# Patient Record
Sex: FEMALE | Race: WHITE | HISPANIC OR LATINO | Employment: FULL TIME | URBAN - NONMETROPOLITAN AREA
[De-identification: names, ages, dates, MRNs, and addresses within clinical notes are randomized per-mention and may not be internally consistent; named-entity substitution may affect disease eponyms.]

---

## 2023-12-07 ENCOUNTER — OFFICE VISIT (OUTPATIENT)
Dept: URGENT CARE | Facility: CLINIC | Age: 18
End: 2023-12-07
Payer: COMMERCIAL

## 2023-12-07 VITALS
BODY MASS INDEX: 34.71 KG/M2 | SYSTOLIC BLOOD PRESSURE: 127 MMHG | HEIGHT: 62 IN | TEMPERATURE: 98.4 F | OXYGEN SATURATION: 98 % | DIASTOLIC BLOOD PRESSURE: 68 MMHG | HEART RATE: 97 BPM | WEIGHT: 188.6 LBS | RESPIRATION RATE: 20 BRPM

## 2023-12-07 DIAGNOSIS — Z02.4 DRIVER'S PERMIT PE (PHYSICAL EXAMINATION): Primary | ICD-10-CM

## 2023-12-07 NOTE — LETTER
December 7, 2023     Patient: Chetna Lopes   YOB: 2005   Date of Visit: 12/7/2023       To Whom it May Concern:    Chetna Lopes was seen in my clinic on 12/7/2023. If you have any questions or concerns, please don't hesitate to call.          Sincerely,          Junior Winchester PA-C        CC: No Recipients

## 2023-12-07 NOTE — PATIENT INSTRUCTIONS
Teen  Safety   WHAT YOU NEED TO KNOW:   Teen injuries and deaths caused by car crashes can be prevented. Be aware of the leading causes of teen car crashes. Stay safe by using a parent-teen driving agreement. DISCHARGE INSTRUCTIONS:   What to know about teen  safety:  Teen injuries and deaths caused by car crashes can be prevented. Be aware of the leading causes of teen car crashes. Use a parent-teen driving agreement. The agreement goes through safety actions promised by the teen. It also tells what the consequences are if the actions are not followed. Ask your healthcare provider where to get the agreement. Teen  safety guidelines:   Always wear your seatbelt. The easiest way to prevent deaths in crashes is to buckle up. Be aware of possible problems. Problems may include other vehicles, weather, pedestrians, and bicyclists. Make sure to practice on different roads, at different times of day. Also practice in all types of weather. Give driving your full attention. Distractions can increase your risk of a crash. Do not  talk on a cellphone or text while driving. Food and radios can also be a distraction while you are driving. Do not eat or try to adjust the radio while driving. Limit the number of teen passengers. Your risk for a crash goes up if you allow other teens in your car. Follow your state's restrictions for the number of teens in your car. Your state may say 0 to 1 teen. Nighttime driving increases your risk for crashes. Drive during daytime hours or be off the road fairly early in the evening. Be well rested when you drive. Do not  drive while you are drowsy or tired. Do not drive while impaired. One alcoholic drink can cause impairment. Drugs can also cause impairment. Do not  drive if you are using drugs. Obey the speed limits. Make sure your speed matches the road conditions.  Leave enough space between you and the car in front of you in case of sudden stops. © Copyright [employer] 2023 Information is for End User's use only and may not be sold, redistributed or otherwise used for commercial purposes. The above information is an  only. It is not intended as medical advice for individual conditions or treatments. Talk to your doctor, nurse or pharmacist before following any medical regimen to see if it is safe and effective for you.

## 2023-12-07 NOTE — PROGRESS NOTES
[facility]        NAME: Lorenzo Bowen is a 19 y.o. female  : 2005    MRN: [id number]  DATE: 2023  TIME: 3:12 PM    Assessment and Plan   's permit PE (physical examination) [Z02.4]  1. 's permit PE (physical examination)              Patient Instructions     Patient Instructions   Teen  Safety   WHAT YOU NEED TO KNOW:   Teen injuries and deaths caused by car crashes can be prevented. Be aware of the leading causes of teen car crashes. Stay safe by using a parent-teen driving agreement. DISCHARGE INSTRUCTIONS:   What to know about teen  safety:  Teen injuries and deaths caused by car crashes can be prevented. Be aware of the leading causes of teen car crashes. Use a parent-teen driving agreement. The agreement goes through safety actions promised by the teen. It also tells what the consequences are if the actions are not followed. Ask your healthcare provider where to get the agreement. Teen  safety guidelines:   Always wear your seatbelt. The easiest way to prevent deaths in crashes is to buckle up. Be aware of possible problems. Problems may include other vehicles, weather, pedestrians, and bicyclists. Make sure to practice on different roads, at different times of day. Also practice in all types of weather. Give driving your full attention. Distractions can increase your risk of a crash. Do not  talk on a cellphone or text while driving. Food and radios can also be a distraction while you are driving. Do not eat or try to adjust the radio while driving. Limit the number of teen passengers. Your risk for a crash goes up if you allow other teens in your car. Follow your state's restrictions for the number of teens in your car. Your state may say 0 to 1 teen. Nighttime driving increases your risk for crashes. Drive during daytime hours or be off the road fairly early in the evening. Be well rested when you drive.   Do not drive while you are drowsy or tired. Do not drive while impaired. One alcoholic drink can cause impairment. Drugs can also cause impairment. Do not  drive if you are using drugs. Obey the speed limits. Make sure your speed matches the road conditions. Leave enough space between you and the car in front of you in case of sudden stops. © Copyright [employer] 2023 Information is for End User's use only and may not be sold, redistributed or otherwise used for commercial purposes. The above information is an  only. It is not intended as medical advice for individual conditions or treatments. Talk to your doctor, nurse or pharmacist before following any medical regimen to see if it is safe and effective for you. Follow up with PCP in 3-5 days. Proceed to  ER if symptoms worsen. Chief Complaint     Chief Complaint   Patient presents with    drivers license physical         History of Present Illness       Patient presents to the clinic for a 's physical.  I did review her history. There is no history of seizure disorder, syncope, hypertension, diabetes, neurological disorder, psychiatric disorder, or other medical problems that would interfere with driving. She denies drug or alcohol abuse. Review of Systems   Review of Systems   Constitutional:  Negative for chills and fever. HENT:  Negative for ear pain and sore throat. Eyes:  Negative for pain and visual disturbance. Respiratory:  Negative for cough and shortness of breath. Cardiovascular:  Negative for chest pain and palpitations. Gastrointestinal:  Negative for abdominal pain and vomiting. Genitourinary:  Negative for dysuria and hematuria. Musculoskeletal:  Negative for arthralgias and back pain. Skin:  Negative for color change and rash. Neurological:  Negative for seizures and syncope. All other systems reviewed and are negative.         Current Medications     No current outpatient medications on file. Current Allergies     Allergies as of 12/07/2023    (No Known Allergies)            The following portions of the patient's history were reviewed and updated as appropriate: allergies, current medications, past family history, past medical history, past social history, past surgical history and problem list.     History reviewed. No pertinent past medical history. History reviewed. No pertinent surgical history. History reviewed. No pertinent family history. Medications have been verified. Objective   /68   Pulse 97   Temp 98.4 °F (36.9 °C)   Resp 20   Ht 5' 2" (1.575 m)   Wt 85.5 kg (188 lb 9.6 oz)   SpO2 98%   BMI 34.50 kg/m²        Physical Exam     Physical Exam  Constitutional:       Appearance: She is well-developed. She is not diaphoretic. HENT:      Head: Normocephalic. Eyes:      General:         Right eye: No discharge. Left eye: No discharge. Pupils: Pupils are equal, round, and reactive to light. Neck:      Thyroid: No thyromegaly. Cardiovascular:      Rate and Rhythm: Normal rate. Heart sounds: No murmur heard. Pulmonary:      Effort: Pulmonary effort is normal. No respiratory distress. Breath sounds: No wheezing or rales. Chest:      Chest wall: No tenderness. Abdominal:      General: There is no distension. Palpations: Abdomen is soft. Tenderness: There is no abdominal tenderness. There is no guarding or rebound. Musculoskeletal:         General: Normal range of motion. Cervical back: Normal range of motion. Lymphadenopathy:      Cervical: No cervical adenopathy. Skin:     General: Skin is warm. Neurological:      Mental Status: She is alert and oriented to person, place, and time.                -Are no restrictions to operating a motor vehicle

## 2024-06-01 ENCOUNTER — OFFICE VISIT (OUTPATIENT)
Dept: URGENT CARE | Facility: MEDICAL CENTER | Age: 19
End: 2024-06-01
Payer: COMMERCIAL

## 2024-06-01 VITALS
HEART RATE: 78 BPM | HEIGHT: 61 IN | RESPIRATION RATE: 18 BRPM | TEMPERATURE: 98.9 F | WEIGHT: 170 LBS | BODY MASS INDEX: 32.1 KG/M2 | OXYGEN SATURATION: 98 % | DIASTOLIC BLOOD PRESSURE: 70 MMHG | SYSTOLIC BLOOD PRESSURE: 106 MMHG

## 2024-06-01 DIAGNOSIS — K52.9 GASTROENTERITIS: Primary | ICD-10-CM

## 2024-06-01 PROCEDURE — 99212 OFFICE O/P EST SF 10 MIN: CPT | Performed by: PHYSICIAN ASSISTANT

## 2024-06-01 PROCEDURE — S9088 SERVICES PROVIDED IN URGENT: HCPCS | Performed by: PHYSICIAN ASSISTANT

## 2024-06-01 RX ORDER — ONDANSETRON 4 MG/1
4 TABLET, ORALLY DISINTEGRATING ORAL EVERY 6 HOURS PRN
Qty: 15 TABLET | Refills: 0 | Status: SHIPPED | OUTPATIENT
Start: 2024-06-01

## 2024-06-01 NOTE — LETTER
June 1, 2024     Patient: Krystal Meyer   YOB: 2005   Date of Visit: 6/1/2024       To Whom It May Concern:    It is my medical opinion that Krystal Meyer may return to work on 06/02/24 .    If you have any questions or concerns, please don't hesitate to call.         Sincerely,        Annetta Urbano PA-C    CC: No Recipients

## 2024-06-01 NOTE — PROGRESS NOTES
[facility]        NAME: Krystal Meyer is a 18 y.o. female  : 2005    MRN: [id number]  DATE: 2024  TIME: 6:22 PM    Assessment and Plan   Gastroenteritis [K52.9]  1. Gastroenteritis  ondansetron (ZOFRAN-ODT) 4 mg disintegrating tablet            Patient Instructions     Take Zofran as needed for nausea or vomiting  [place] diet and gradually increase diet as tolerated  No fried food until symptoms resolve  Refrain from dairy products until feeling better.  Fluids  Tylenol as needed for fever or pain.  If symptoms worsen have your self rechecked.  If symptoms fail to improve follow-up with PCP.    Follow up with PCP in 3-5 days.  Proceed to  ER if symptoms worsen.    If tests have been performed at [facility], our office will contact you with results if changes need to be made to the care plan discussed with you at the visit.  You can review your full results on [facility].    Chief Complaint     Chief Complaint   Patient presents with    Diarrhea    Vomiting    Nausea     All symptoms started on Thursday          History of Present Illness       Patient started with nausea vomiting and diarrhea this past Thursday.  There is associated crampy abdominal pain after eating and prior to use of the bathroom.  Patient denies fever or difficulty urinating.  Patient is here to obtain a note for work today.  Other family members have similar symptoms.        Review of Systems   Review of Systems   Constitutional:  Negative for fever.   Gastrointestinal:  Positive for abdominal pain, diarrhea, nausea and vomiting.   Genitourinary:  Negative for difficulty urinating.         Current Medications       Current Outpatient Medications:     ondansetron (ZOFRAN-ODT) 4 mg disintegrating tablet, Take 1 tablet (4 mg total) by mouth every 6 (six) hours as needed for nausea or vomiting, Disp: 15 tablet, Rfl: 0    Current Allergies     Allergies as of 2024    (No Known Allergies)            The  "following portions of the patient's history were reviewed and updated as appropriate: allergies, current medications, past family history, past medical history, past social history, past surgical history and problem list.     No past medical history on file.    No past surgical history on file.    No family history on file.      Medications have been verified.        Objective   /70   Pulse 78   Temp 98.9 °F (37.2 °C)   Resp 18   Ht 5' 1\" (1.549 m)   Wt 77.1 kg (170 lb)   SpO2 98%   BMI 32.12 kg/m²   No LMP recorded.       Physical Exam     Physical Exam  Vitals and nursing note reviewed.   Constitutional:       Appearance: Normal appearance.   HENT:      Head: Normocephalic and atraumatic.      Mouth/Throat:      Mouth: Mucous membranes are moist.      Pharynx: Oropharynx is clear.   Cardiovascular:      Rate and Rhythm: Normal rate.   Pulmonary:      Effort: Pulmonary effort is normal.   Abdominal:      Tenderness: There is no abdominal tenderness.   Skin:     General: Skin is warm.   Neurological:      Mental Status: She is alert.                 "

## 2024-06-01 NOTE — PATIENT INSTRUCTIONS
Take Zofran as needed for nausea or vomiting  [place] diet and gradually increase diet as tolerated  No fried food until symptoms resolve  Refrain from dairy products until feeling better.  Fluids  Tylenol as needed for fever or pain.  If symptoms worsen have your self rechecked.  If symptoms fail to improve follow-up with PCP.

## 2024-07-04 ENCOUNTER — OFFICE VISIT (OUTPATIENT)
Dept: URGENT CARE | Facility: CLINIC | Age: 19
End: 2024-07-04
Payer: COMMERCIAL

## 2024-07-04 ENCOUNTER — APPOINTMENT (OUTPATIENT)
Dept: RADIOLOGY | Facility: CLINIC | Age: 19
End: 2024-07-04
Payer: COMMERCIAL

## 2024-07-04 VITALS
TEMPERATURE: 98.1 F | HEART RATE: 91 BPM | OXYGEN SATURATION: 94 % | RESPIRATION RATE: 18 BRPM | SYSTOLIC BLOOD PRESSURE: 114 MMHG | DIASTOLIC BLOOD PRESSURE: 73 MMHG

## 2024-07-04 DIAGNOSIS — R05.1 ACUTE COUGH: ICD-10-CM

## 2024-07-04 DIAGNOSIS — J06.9 UPPER RESPIRATORY TRACT INFECTION, UNSPECIFIED TYPE: Primary | ICD-10-CM

## 2024-07-04 DIAGNOSIS — J02.9 ACUTE PHARYNGITIS, UNSPECIFIED ETIOLOGY: ICD-10-CM

## 2024-07-04 LAB — S PYO AG THROAT QL: NEGATIVE

## 2024-07-04 PROCEDURE — 71046 X-RAY EXAM CHEST 2 VIEWS: CPT

## 2024-07-04 PROCEDURE — 99213 OFFICE O/P EST LOW 20 MIN: CPT | Performed by: PHYSICIAN ASSISTANT

## 2024-07-04 PROCEDURE — 87880 STREP A ASSAY W/OPTIC: CPT | Performed by: PHYSICIAN ASSISTANT

## 2024-07-04 RX ORDER — AMOXICILLIN AND CLAVULANATE POTASSIUM 875; 125 MG/1; MG/1
1 TABLET, FILM COATED ORAL EVERY 12 HOURS SCHEDULED
Qty: 14 TABLET | Refills: 0 | Status: SHIPPED | OUTPATIENT
Start: 2024-07-04 | End: 2024-07-11

## 2024-07-04 RX ORDER — BENZONATATE 200 MG/1
200 CAPSULE ORAL 3 TIMES DAILY PRN
Qty: 20 CAPSULE | Refills: 0 | Status: SHIPPED | OUTPATIENT
Start: 2024-07-04

## 2024-07-04 RX ORDER — PREDNISONE 10 MG/1
TABLET ORAL
Qty: 20 TABLET | Refills: 0 | Status: SHIPPED | OUTPATIENT
Start: 2024-07-04

## 2024-07-04 NOTE — LETTER
July 4, 2024     Patient: Rosalina Urrutia   YOB: 2005   Date of Visit: 7/4/2024       To Whom it May Concern:    Rosalina Urrutia was seen in my clinic on 7/4/2024. She may return to work on 07/06/2024 .    If you have any questions or concerns, please don't hesitate to call.         Sincerely,          Casrto Bacon PA-C        CC: No Recipients

## 2024-07-04 NOTE — PROGRESS NOTES
[facility]        NAME: Rosalina Urrutia is a 18 y.o. female  : 2005    MRN: [id number]  DATE: 2024  TIME: 2:17 PM    Assessment and Plan   Upper respiratory tract infection, unspecified type [J06.9]  1. Upper respiratory tract infection, unspecified type  amoxicillin-clavulanate (AUGMENTIN) 875-125 mg per tablet    predniSONE 10 mg tablet    benzonatate (TESSALON) 200 MG capsule      2. Acute pharyngitis, unspecified etiology  POCT rapid ANTIGEN strepA      3. Acute cough  XR chest pa & lateral            Patient Instructions   There are no Patient Instructions on file for this visit.      Follow up with PCP in 3-5 days.  Proceed to  ER if symptoms worsen.    Chief Complaint     Chief Complaint   Patient presents with    Cold Like Symptoms     Ptr c/o fever,sore throat, cough, headache, nausea, dizziness since Saturday.         History of Present Illness       Patient presents the clinic for fever, sore throat, cough headache since Saturday.  She also complains of chest tightness and a productive cough.  She states that she felt warm yesterday.        Review of Systems   Review of Systems   Constitutional:  Positive for chills and fever.   HENT:  Positive for sore throat. Negative for congestion, ear pain, hearing loss, mouth sores, nosebleeds, postnasal drip, rhinorrhea, sinus pressure and sinus pain.    Eyes:  Negative for pain and visual disturbance.   Respiratory:  Positive for cough. Negative for shortness of breath, wheezing and stridor.    Cardiovascular:  Negative for chest pain and palpitations.   Gastrointestinal:  Negative for abdominal pain and vomiting.   Genitourinary:  Negative for dysuria and hematuria.   Musculoskeletal:  Negative for arthralgias and back pain.   Skin:  Negative for color change and rash.   Neurological:  Positive for dizziness. Negative for seizures and syncope.   All other systems reviewed and are negative.        Current Medications       Current  Outpatient Medications:     amoxicillin-clavulanate (AUGMENTIN) 875-125 mg per tablet, Take 1 tablet by mouth every 12 (twelve) hours for 7 days, Disp: 14 tablet, Rfl: 0    benzonatate (TESSALON) 200 MG capsule, Take 1 capsule (200 mg total) by mouth 3 (three) times a day as needed for cough, Disp: 20 capsule, Rfl: 0    predniSONE 10 mg tablet, 4 po for 2 days, then 3x2 2x2, and 1x2, Disp: 20 tablet, Rfl: 0    Current Allergies     Allergies as of 07/04/2024    (No Known Allergies)            The following portions of the patient's history were reviewed and updated as appropriate: allergies, current medications, past family history, past medical history, past social history, past surgical history and problem list.     History reviewed. No pertinent past medical history.    Past Surgical History:   Procedure Laterality Date    BREAST SURGERY      CHOLECYSTECTOMY         History reviewed. No pertinent family history.      Medications have been verified.        Objective   /73   Pulse 91   Temp 98.1 °F (36.7 °C) (Temporal)   Resp 18   LMP 06/12/2024 (Approximate)   SpO2 94%        Physical Exam     Physical Exam  Constitutional:       Appearance: She is well-developed. She is not diaphoretic.   HENT:      Head: Normocephalic.      Right Ear: Tympanic membrane normal.      Left Ear: Tympanic membrane normal.      Mouth/Throat:      Pharynx: Posterior oropharyngeal erythema present.   Eyes:      General:         Right eye: No discharge.         Left eye: No discharge.      Pupils: Pupils are equal, round, and reactive to light.   Neck:      Thyroid: No thyromegaly.   Cardiovascular:      Rate and Rhythm: Normal rate.      Heart sounds: No murmur heard.  Pulmonary:      Effort: Pulmonary effort is normal. No respiratory distress.      Breath sounds: Rhonchi present. No wheezing or rales.   Chest:      Chest wall: No tenderness.   Abdominal:      General: There is no distension.      Palpations: Abdomen is soft.       Tenderness: There is no abdominal tenderness. There is no guarding or rebound.   Musculoskeletal:         General: Normal range of motion.      Cervical back: Normal range of motion.   Lymphadenopathy:      Cervical: No cervical adenopathy.   Skin:     General: Skin is warm.   Neurological:      Mental Status: She is alert and oriented to person, place, and time.         -I personally interpreted the x-ray and reviewed it with the patient.  There is no acute infiltrate.  I will treat for suspected respiratory infection.  I suggest follow-up with PCP or go to the ER if symptoms worsen

## 2025-08-17 ENCOUNTER — OFFICE VISIT (OUTPATIENT)
Dept: URGENT CARE | Facility: CLINIC | Age: 20
End: 2025-08-17
Payer: COMMERCIAL

## 2025-08-17 VITALS
WEIGHT: 187 LBS | BODY MASS INDEX: 36.71 KG/M2 | OXYGEN SATURATION: 99 % | TEMPERATURE: 98.3 F | HEIGHT: 60 IN | HEART RATE: 93 BPM | DIASTOLIC BLOOD PRESSURE: 68 MMHG | RESPIRATION RATE: 16 BRPM | SYSTOLIC BLOOD PRESSURE: 104 MMHG

## 2025-08-17 DIAGNOSIS — B37.31 VAGINAL CANDIDA: ICD-10-CM

## 2025-08-17 DIAGNOSIS — N30.00 ACUTE CYSTITIS WITHOUT HEMATURIA: Primary | ICD-10-CM

## 2025-08-17 LAB
SL AMB  POCT GLUCOSE, UA: YELLOW
SL AMB LEUKOCYTE ESTERASE,UA: ABNORMAL
SL AMB POCT BILIRUBIN,UA: NEGATIVE
SL AMB POCT BLOOD,UA: NEGATIVE
SL AMB POCT COLOR,UA: ABNORMAL
SL AMB POCT KETONES,UA: ABNORMAL
SL AMB POCT NITRITE,UA: NEGATIVE
SL AMB POCT PH,UA: 6.5
SL AMB POCT SPECIFIC GRAVITY,UA: 1.01
SL AMB POCT URINE PROTEIN: ABNORMAL
SL AMB POCT UROBILINOGEN: 0.2

## 2025-08-17 PROCEDURE — 99213 OFFICE O/P EST LOW 20 MIN: CPT

## 2025-08-17 PROCEDURE — 87086 URINE CULTURE/COLONY COUNT: CPT

## 2025-08-17 PROCEDURE — S9088 SERVICES PROVIDED IN URGENT: HCPCS

## 2025-08-17 PROCEDURE — 81002 URINALYSIS NONAUTO W/O SCOPE: CPT

## 2025-08-17 RX ORDER — FLUCONAZOLE 150 MG/1
150 TABLET ORAL ONCE
Qty: 1 TABLET | Refills: 0 | Status: SHIPPED | OUTPATIENT
Start: 2025-08-17 | End: 2025-08-17

## 2025-08-17 RX ORDER — NITROFURANTOIN 25; 75 MG/1; MG/1
100 CAPSULE ORAL 2 TIMES DAILY
Qty: 10 CAPSULE | Refills: 0 | Status: SHIPPED | OUTPATIENT
Start: 2025-08-17 | End: 2025-08-22

## 2025-08-18 LAB — BACTERIA UR CULT: NORMAL
